# Patient Record
Sex: FEMALE | Race: AMERICAN INDIAN OR ALASKA NATIVE | NOT HISPANIC OR LATINO | Employment: UNEMPLOYED | URBAN - METROPOLITAN AREA
[De-identification: names, ages, dates, MRNs, and addresses within clinical notes are randomized per-mention and may not be internally consistent; named-entity substitution may affect disease eponyms.]

---

## 2017-03-29 PROBLEM — M19.179 POST-TRAUMATIC OSTEOARTHRITIS OF ANKLE: Status: ACTIVE | Noted: 2017-03-29

## 2017-05-02 PROBLEM — E03.9 HYPOTHYROIDISM: Status: ACTIVE | Noted: 2017-05-02

## 2017-05-02 PROBLEM — E03.4 HYPOTHYROIDISM DUE TO ACQUIRED ATROPHY OF THYROID: Status: ACTIVE | Noted: 2017-05-02

## 2017-12-13 ENCOUNTER — TELEPHONE (OUTPATIENT)
Dept: ADMINISTRATIVE | Facility: HOSPITAL | Age: 42
End: 2017-12-13

## 2018-01-24 ENCOUNTER — TELEPHONE (OUTPATIENT)
Dept: ADMINISTRATIVE | Facility: HOSPITAL | Age: 43
End: 2018-01-24

## 2018-06-20 ENCOUNTER — TELEPHONE (OUTPATIENT)
Dept: ADMINISTRATIVE | Facility: HOSPITAL | Age: 43
End: 2018-06-20

## 2018-08-03 ENCOUNTER — TELEPHONE (OUTPATIENT)
Dept: ADMINISTRATIVE | Facility: HOSPITAL | Age: 43
End: 2018-08-03

## 2018-09-07 ENCOUNTER — TELEPHONE (OUTPATIENT)
Dept: ADMINISTRATIVE | Facility: HOSPITAL | Age: 43
End: 2018-09-07

## 2018-10-04 ENCOUNTER — TELEPHONE (OUTPATIENT)
Dept: ADMINISTRATIVE | Facility: HOSPITAL | Age: 43
End: 2018-10-04

## 2020-07-10 PROBLEM — E03.9 HYPOTHYROIDISM: Status: RESOLVED | Noted: 2017-05-02 | Resolved: 2020-07-10

## 2020-07-10 PROBLEM — N92.1 MENORRHAGIA WITH IRREGULAR CYCLE: Status: ACTIVE | Noted: 2020-07-10

## 2020-08-17 PROBLEM — N92.0 MENORRHAGIA: Status: ACTIVE | Noted: 2020-08-17

## 2020-11-10 PROBLEM — N92.0 MENORRHAGIA: Status: RESOLVED | Noted: 2020-08-17 | Resolved: 2020-11-10

## 2020-11-10 PROBLEM — E78.2 MIXED HYPERLIPIDEMIA: Status: ACTIVE | Noted: 2020-11-10

## 2020-11-10 PROBLEM — N92.1 MENORRHAGIA WITH IRREGULAR CYCLE: Status: RESOLVED | Noted: 2020-07-10 | Resolved: 2020-11-10

## 2021-01-16 DIAGNOSIS — U07.1 COVID-19 VIRUS DETECTED: ICD-10-CM

## 2021-02-25 PROBLEM — Z86.16 HISTORY OF 2019 NOVEL CORONAVIRUS DISEASE (COVID-19): Status: ACTIVE | Noted: 2021-02-25

## 2021-05-06 ENCOUNTER — PATIENT MESSAGE (OUTPATIENT)
Dept: RESEARCH | Facility: HOSPITAL | Age: 46
End: 2021-05-06

## 2022-01-21 DIAGNOSIS — U07.1 COVID-19 VIRUS DETECTED: ICD-10-CM

## 2022-01-26 ENCOUNTER — LAB VISIT (OUTPATIENT)
Dept: PRIMARY CARE CLINIC | Facility: OTHER | Age: 47
End: 2022-01-26
Attending: INTERNAL MEDICINE
Payer: MEDICAID

## 2022-01-26 DIAGNOSIS — Z11.52 ENCOUNTER FOR SCREENING FOR COVID-19: Primary | ICD-10-CM

## 2022-01-26 LAB
CTP QC/QA: YES
SARS-COV-2 RDRP RESP QL NAA+PROBE: NEGATIVE

## 2022-01-26 PROCEDURE — U0002 COVID-19 LAB TEST NON-CDC: HCPCS

## 2022-06-07 ENCOUNTER — ANESTHESIA EVENT (OUTPATIENT)
Dept: SURGERY | Facility: HOSPITAL | Age: 47
End: 2022-06-07
Payer: MEDICAID

## 2022-06-07 NOTE — DISCHARGE INSTRUCTIONS
BEFORE THE PROCEDURE:    REPORT ANY CHANGE IN YOUR PHYSICAL CONDITION TO YOUR DOCTOR IMMEDIATELY.  SELF ISOLATE AND CHECK TEMPERATURE DAILY, IF TEMP OVER 100, CALL PHYSICIAN IMMEDIATELY.  TRY TO REFRAIN FROM SMOKING AND ALCOHOL 72 HOURS BEFORE YOUR PROCEDURE.   DO NOT EAT OR DRINK ANYTHING AFTER MIDNIGHT THE NIGHT BEFORE YOUR PROCEDURE.  NO MAKE UP, NAIL POLISH OR JEWELRY.      Surgery prep    SOMEONE WILL CALL YOU THE DAY BEFORE YOUR PROCEDURE WITH A CHECK-IN TIME FOR YOUR PROCEDURE.    DAY OF YOUR PROCEDURE:    TAKE BLOOD PRESSURE MEDICATIONS THE MORNING OF YOUR PROCEDURE, WITH SMALL SIPS WATER, AS DIRECTED BY YOUR PHYSICIAN.   DO NOT TAKE ANY DIABETIC MEDICATIONS UNLESS DIRECTED TO DO SO BY YOUR PHYSICIAN.   CONTACT LENSES AND DENTURES MUST BE REMOVED.  A RESPONSIBLE ADULT MUST ACCOMPANY YOU HOME UPON DISCHARGE.   ONLY 1 VISITOR ALLOWED PER ROOM.     YOUR THOUGHTS AND OPINIONS HELP US TO BETTER SERVE YOU.     PLEASE PARTICIPATE IN SURVEYS ABOUT YOUR CARE.    THANK YOU FOR CHOOSING [facility].

## 2022-06-08 ENCOUNTER — LAB VISIT (OUTPATIENT)
Dept: LAB | Facility: HOSPITAL | Age: 47
End: 2022-06-08
Attending: PODIATRIST
Payer: MEDICAID

## 2022-06-08 ENCOUNTER — HOSPITAL ENCOUNTER (OUTPATIENT)
Dept: PREADMISSION TESTING | Facility: HOSPITAL | Age: 47
Discharge: HOME | End: 2022-06-08
Attending: PODIATRIST
Payer: MEDICAID

## 2022-06-08 VITALS — BODY MASS INDEX: 44.16 KG/M2 | WEIGHT: 240 LBS | HEIGHT: 62 IN

## 2022-06-08 DIAGNOSIS — Z01.818 PREOP TESTING: Primary | ICD-10-CM

## 2022-06-08 DIAGNOSIS — Z01.818 PREOPERATIVE CLEARANCE: Primary | ICD-10-CM

## 2022-06-08 LAB
ANION GAP SERPL CALC-SCNC: 4 MMOL/L (ref 8–16)
APTT BLDCRRT: 27.7 SEC (ref 21–32)
B-HCG UR QL: NEGATIVE
BASOPHILS # BLD AUTO: 0.04 K/UL (ref 0–0.2)
BASOPHILS NFR BLD: 0.7 % (ref 0–1.9)
BUN SERPL-MCNC: 12 MG/DL (ref 6–20)
CALCIUM SERPL-MCNC: 8.6 MG/DL (ref 8.7–10.5)
CHLORIDE SERPL-SCNC: 105 MMOL/L (ref 95–110)
CO2 SERPL-SCNC: 30 MMOL/L (ref 23–29)
CREAT SERPL-MCNC: 1 MG/DL (ref 0.5–1.4)
DIFFERENTIAL METHOD: NORMAL
EOSINOPHIL # BLD AUTO: 0.1 K/UL (ref 0–0.5)
EOSINOPHIL NFR BLD: 1.3 % (ref 0–8)
ERYTHROCYTE [DISTWIDTH] IN BLOOD BY AUTOMATED COUNT: 12.9 % (ref 11.5–14.5)
EST. GFR  (AFRICAN AMERICAN): >60 ML/MIN/1.73 M^2
EST. GFR  (NON AFRICAN AMERICAN): >60 ML/MIN/1.73 M^2
GLUCOSE SERPL-MCNC: 149 MG/DL (ref 70–110)
HCT VFR BLD AUTO: 40.2 % (ref 37–48.5)
HGB BLD-MCNC: 13 G/DL (ref 12–16)
IMM GRANULOCYTES # BLD AUTO: 0.02 K/UL (ref 0–0.04)
IMM GRANULOCYTES NFR BLD AUTO: 0.4 % (ref 0–0.5)
INR PPP: 1 (ref 0.8–1.2)
LYMPHOCYTES # BLD AUTO: 1.8 K/UL (ref 1–4.8)
LYMPHOCYTES NFR BLD: 32.2 % (ref 18–48)
MCH RBC QN AUTO: 28.2 PG (ref 27–31)
MCHC RBC AUTO-ENTMCNC: 32.3 G/DL (ref 32–36)
MCV RBC AUTO: 87 FL (ref 82–98)
MONOCYTES # BLD AUTO: 0.4 K/UL (ref 0.3–1)
MONOCYTES NFR BLD: 6.3 % (ref 4–15)
NEUTROPHILS # BLD AUTO: 3.3 K/UL (ref 1.8–7.7)
NEUTROPHILS NFR BLD: 59.1 % (ref 38–73)
NRBC BLD-RTO: 0 /100 WBC
PLATELET # BLD AUTO: 203 K/UL (ref 150–450)
PMV BLD AUTO: 10.7 FL (ref 9.2–12.9)
POTASSIUM SERPL-SCNC: 3.7 MMOL/L (ref 3.5–5.1)
PROTHROMBIN TIME: 10.6 SEC (ref 9–12.5)
RBC # BLD AUTO: 4.61 M/UL (ref 4–5.4)
SODIUM SERPL-SCNC: 139 MMOL/L (ref 136–145)
WBC # BLD AUTO: 5.56 K/UL (ref 3.9–12.7)

## 2022-06-08 PROCEDURE — 80048 BASIC METABOLIC PNL TOTAL CA: CPT | Performed by: PODIATRIST

## 2022-06-08 PROCEDURE — 36415 COLL VENOUS BLD VENIPUNCTURE: CPT | Performed by: PODIATRIST

## 2022-06-08 PROCEDURE — 81025 URINE PREGNANCY TEST: CPT | Performed by: PODIATRIST

## 2022-06-08 PROCEDURE — 85025 COMPLETE CBC W/AUTO DIFF WBC: CPT | Performed by: PODIATRIST

## 2022-06-08 RX ORDER — FUROSEMIDE 20 MG/1
20 TABLET ORAL DAILY PRN
COMMUNITY
Start: 2022-04-06 | End: 2022-09-09 | Stop reason: SDUPTHER

## 2022-06-08 NOTE — ANESTHESIA PREPROCEDURE EVALUATION
06/08/2022  Valencia Shah is a 46 y.o., female.      Pre-op Assessment    I have reviewed the Patient Summary Reports.    I have reviewed the NPO Status.   I have reviewed the Medications.     Review of Systems  Anesthesia Hx:  No problems with previous Anesthesia  Denies Family Hx of Anesthesia complications.   Denies Personal Hx of Anesthesia complications.   Social:  Non-Smoker    Cardiovascular:  Cardiovascular Normal Hypertension, well controlled  ECG has been reviewed.    Pulmonary:  Pulmonary Normal    Renal/:  Renal/ Normal     Hepatic/GI:  Hepatic/GI Normal    Musculoskeletal:   Arthritis     Neurological:   Headaches    Endocrine:   Diabetes, well controlled, type 2      Lab Results   Component Value Date    WBC 6.22 02/02/2021    HGB 13.0 02/02/2021    HCT 41.3 02/02/2021    MCV 89 02/02/2021     02/02/2021     CMP  Sodium   Date Value Ref Range Status   03/23/2022 139 136 - 145 mmol/L Final     Potassium   Date Value Ref Range Status   03/23/2022 4.6 3.5 - 5.1 mmol/L Final     Chloride   Date Value Ref Range Status   03/23/2022 104 95 - 110 mmol/L Final     CO2   Date Value Ref Range Status   03/23/2022 28 23 - 29 mmol/L Final     Glucose   Date Value Ref Range Status   03/23/2022 99 70 - 110 mg/dL Final     BUN   Date Value Ref Range Status   03/23/2022 16 6 - 20 mg/dL Final     Creatinine   Date Value Ref Range Status   03/23/2022 0.9 0.5 - 1.4 mg/dL Final     Calcium   Date Value Ref Range Status   03/23/2022 9.4 8.7 - 10.5 mg/dL Final     Total Protein   Date Value Ref Range Status   02/02/2021 7.5 6.0 - 8.4 g/dL Final     Albumin   Date Value Ref Range Status   02/02/2021 3.8 3.5 - 5.2 g/dL Final     Total Bilirubin   Date Value Ref Range Status   02/02/2021 0.4 0.1 - 1.0 mg/dL Final     Comment:     For infants and newborns, interpretation of results should be based  on  gestational age, weight and in agreement with clinical  observations.    Premature Infant recommended reference ranges:  Up to 24 hours.............<8.0 mg/dL  Up to 48 hours............<12.0 mg/dL  3-5 days..................<15.0 mg/dL  6-29 days.................<15.0 mg/dL       Alkaline Phosphatase   Date Value Ref Range Status   02/02/2021 82 55 - 135 U/L Final     AST   Date Value Ref Range Status   02/02/2021 32 10 - 40 U/L Final     ALT   Date Value Ref Range Status   02/02/2021 68 (H) 10 - 44 U/L Final     Anion Gap   Date Value Ref Range Status   03/23/2022 7 (L) 8 - 16 mmol/L Final     eGFR if    Date Value Ref Range Status   03/23/2022 >60.0 >60 mL/min/1.73 m^2 Final     eGFR if non    Date Value Ref Range Status   03/23/2022 >60.0 >60 mL/min/1.73 m^2 Final     Comment:     Calculation used to obtain the estimated glomerular filtration  rate (eGFR) is the CKD-EPI equation.          Physical Exam  General: Well nourished    Airway:  Mallampati: II / II  Mouth Opening: Normal  TM Distance: Normal  Tongue: Normal  Neck ROM: Normal ROM    Dental:  Intact    Chest/Lungs:  Clear to auscultation    Heart:  Rate: Normal  Rhythm: Regular Rhythm  Sounds: Normal        Anesthesia Plan  Type of Anesthesia, risks & benefits discussed:    Anesthesia Type: Gen Supraglottic Airway, MAC  Intra-op Monitoring Plan: Standard ASA Monitors  Post Op Pain Control Plan: multimodal analgesia  Induction:  IV  Airway Plan: Direct  Informed Consent: Informed consent signed with the Patient and all parties understand the risks and agree with anesthesia plan.  All questions answered.   ASA Score: 3  Day of Surgery Review of History & Physical: I have interviewed and examined the patient. I have reviewed the patient's H&P dated: There are no significant changes.     Ready For Surgery From Anesthesia Perspective.     .

## 2022-06-10 ENCOUNTER — ANESTHESIA (OUTPATIENT)
Dept: SURGERY | Facility: HOSPITAL | Age: 47
End: 2022-06-10
Payer: MEDICAID

## 2022-06-17 ENCOUNTER — HOSPITAL ENCOUNTER (OUTPATIENT)
Facility: HOSPITAL | Age: 47
Discharge: HOME | End: 2022-06-17
Attending: PODIATRIST | Admitting: PODIATRIST
Payer: MEDICAID

## 2022-06-17 VITALS
TEMPERATURE: 98 F | OXYGEN SATURATION: 100 % | SYSTOLIC BLOOD PRESSURE: 97 MMHG | RESPIRATION RATE: 16 BRPM | DIASTOLIC BLOOD PRESSURE: 51 MMHG | HEART RATE: 64 BPM

## 2022-06-17 DIAGNOSIS — L85.8 CUTANEOUS HORN: ICD-10-CM

## 2022-06-17 LAB — POCT GLUCOSE: 109 MG/DL (ref 70–110)

## 2022-06-17 PROCEDURE — 37000009 HC ANESTHESIA EA ADD 15 MINS: Performed by: PODIATRIST

## 2022-06-17 PROCEDURE — 71000016 HC POSTOP RECOV ADDL HR: Performed by: PODIATRIST

## 2022-06-17 PROCEDURE — 63600175 PHARM REV CODE 636 W HCPCS: Performed by: NURSE ANESTHETIST, CERTIFIED REGISTERED

## 2022-06-17 PROCEDURE — 36000706: Performed by: PODIATRIST

## 2022-06-17 PROCEDURE — 71000015 HC POSTOP RECOV 1ST HR: Performed by: PODIATRIST

## 2022-06-17 PROCEDURE — 63600175 PHARM REV CODE 636 W HCPCS: Performed by: PODIATRIST

## 2022-06-17 PROCEDURE — 00400 ANES INTEGUMENTARY SYS NOS: CPT | Performed by: PODIATRIST

## 2022-06-17 PROCEDURE — 37000008 HC ANESTHESIA 1ST 15 MINUTES: Performed by: PODIATRIST

## 2022-06-17 PROCEDURE — 36000707: Performed by: PODIATRIST

## 2022-06-17 PROCEDURE — 25000003 PHARM REV CODE 250: Performed by: PODIATRIST

## 2022-06-17 RX ORDER — FENTANYL CITRATE 50 UG/ML
INJECTION, SOLUTION INTRAMUSCULAR; INTRAVENOUS
Status: DISCONTINUED | OUTPATIENT
Start: 2022-06-17 | End: 2022-06-17

## 2022-06-17 RX ORDER — BUPIVACAINE HYDROCHLORIDE 5 MG/ML
INJECTION, SOLUTION EPIDURAL; INTRACAUDAL
Status: DISCONTINUED | OUTPATIENT
Start: 2022-06-17 | End: 2022-06-17 | Stop reason: HOSPADM

## 2022-06-17 RX ORDER — LIDOCAINE HYDROCHLORIDE 10 MG/ML
INJECTION, SOLUTION INTRAVENOUS
Status: DISCONTINUED | OUTPATIENT
Start: 2022-06-17 | End: 2022-06-17

## 2022-06-17 RX ORDER — SODIUM CHLORIDE 0.9 % (FLUSH) 0.9 %
10 SYRINGE (ML) INJECTION
Status: DISCONTINUED | OUTPATIENT
Start: 2022-06-17 | End: 2022-06-17 | Stop reason: HOSPADM

## 2022-06-17 RX ORDER — ONDANSETRON 2 MG/ML
INJECTION INTRAMUSCULAR; INTRAVENOUS
Status: DISCONTINUED | OUTPATIENT
Start: 2022-06-17 | End: 2022-06-17

## 2022-06-17 RX ORDER — HYDROCODONE BITARTRATE AND ACETAMINOPHEN 5; 325 MG/1; MG/1
1 TABLET ORAL EVERY 4 HOURS PRN
Status: DISCONTINUED | OUTPATIENT
Start: 2022-06-17 | End: 2022-06-17 | Stop reason: HOSPADM

## 2022-06-17 RX ORDER — CEFAZOLIN SODIUM 2 G/50ML
2 SOLUTION INTRAVENOUS
Status: COMPLETED | OUTPATIENT
Start: 2022-06-17 | End: 2022-06-17

## 2022-06-17 RX ORDER — PHENYLEPHRINE HYDROCHLORIDE 10 MG/ML
INJECTION INTRAVENOUS
Status: DISCONTINUED | OUTPATIENT
Start: 2022-06-17 | End: 2022-06-17

## 2022-06-17 RX ORDER — PROPOFOL 10 MG/ML
INJECTION, EMULSION INTRAVENOUS
Status: DISCONTINUED | OUTPATIENT
Start: 2022-06-17 | End: 2022-06-17

## 2022-06-17 RX ORDER — MIDAZOLAM HYDROCHLORIDE 1 MG/ML
INJECTION INTRAMUSCULAR; INTRAVENOUS
Status: DISCONTINUED | OUTPATIENT
Start: 2022-06-17 | End: 2022-06-17

## 2022-06-17 RX ORDER — SODIUM CHLORIDE 9 MG/ML
INJECTION, SOLUTION INTRAVENOUS CONTINUOUS
Status: DISCONTINUED | OUTPATIENT
Start: 2022-06-17 | End: 2022-06-17 | Stop reason: HOSPADM

## 2022-06-17 RX ORDER — ONDANSETRON 4 MG/1
8 TABLET, ORALLY DISINTEGRATING ORAL EVERY 8 HOURS PRN
Status: DISCONTINUED | OUTPATIENT
Start: 2022-06-17 | End: 2022-06-17 | Stop reason: HOSPADM

## 2022-06-17 RX ADMIN — ONDANSETRON 4 MG: 2 INJECTION INTRAMUSCULAR; INTRAVENOUS at 07:06

## 2022-06-17 RX ADMIN — PROPOFOL 50 MG: 10 INJECTION, EMULSION INTRAVENOUS at 07:06

## 2022-06-17 RX ADMIN — MIDAZOLAM 2 MG: 1 INJECTION INTRAMUSCULAR; INTRAVENOUS at 07:06

## 2022-06-17 RX ADMIN — PROPOFOL 80 MG: 10 INJECTION, EMULSION INTRAVENOUS at 07:06

## 2022-06-17 RX ADMIN — PROPOFOL 30 MG: 10 INJECTION, EMULSION INTRAVENOUS at 07:06

## 2022-06-17 RX ADMIN — FENTANYL CITRATE 100 MCG: 50 INJECTION INTRAMUSCULAR; INTRAVENOUS at 07:06

## 2022-06-17 RX ADMIN — PHENYLEPHRINE HYDROCHLORIDE 100 MCG: 10 INJECTION INTRAVENOUS at 08:06

## 2022-06-17 RX ADMIN — LIDOCAINE HYDROCHLORIDE 50 MG: 10 INJECTION, SOLUTION INTRAVENOUS at 07:06

## 2022-06-17 RX ADMIN — PROPOFOL 30 MG: 10 INJECTION, EMULSION INTRAVENOUS at 08:06

## 2022-06-17 RX ADMIN — SODIUM CHLORIDE: 0.9 INJECTION, SOLUTION INTRAVENOUS at 06:06

## 2022-06-17 RX ADMIN — CEFAZOLIN SODIUM 2 G: 2 SOLUTION INTRAVENOUS at 06:06

## 2022-06-17 NOTE — PLAN OF CARE
Received pt to room 512 accompanied by NickyRN, pt aa&ox3, no c/o, rt foot ice and elevated. Dr jackson at bedside talking to pt and pts daughter in law. Ortho shoe on rt foot.

## 2022-06-17 NOTE — DISCHARGE INSTRUCTIONS
FOLLOW UP WITH DR LOUIE ON Monday June 20th @ 3:30PM.    TAKE PAIN MEDS AS PRESCRIBED.    NO DRIVING UNTIL DR LOUIE ALLOWS.    NO DRINKING ALCOHOL FOR 24 HOURS OR WHILE TAKING PAIN MEDS.    CALL DR LOUIE'S OFFICE FOR ANY QUESTIONS OR CONCERNS.  REPORT TO ER IF URGENT.    THANK YOU FOR CHOOSING [facility]!

## 2022-06-17 NOTE — BRIEF OP NOTE
[facility] - Surgery  Brief Operative Note    Surgery Date: 6/17/2022     Surgeon(s) and Role:     * Davin Jose DPM - Primary    Assisting Surgeon: None    Pre-op Diagnosis:  Cutaneous horn [L85.8]    Post-op Diagnosis:  Post-Op Diagnosis Codes:     * Cutaneous horn [L85.8]    Procedure(s) (LRB):  REMOVAL, FOREIGN BODY, FOOT (Right)    Anesthesia: MAC    Operative Findings: cutaneous horn, posterior lateral aspect of right heel. Measures 2cm in diameter.     Estimated Blood Loss: 5ml         Specimens:   Specimen (24h ago, onward)             Start     Ordered    06/17/22 0810  Specimen to Pathology, Surgery Other  Once        Comments: Pre-op Diagnosis: Cutaneous horn [L85.8]Procedure(s):REMOVAL, FOREIGN BODY, FOOT Number of specimens: 1Name of specimens: 1) subcutaneous horn right heel @ 0801     References:    Click here for ordering Quick Tip   Question Answer Comment   Procedure Type: Other    Specimen Class: Routine/Screening    Which provider would you like to cc? DAVIN JOSE    Release to patient Immediate        06/17/22 0810                  Discharge Note    OUTCOME: Patient tolerated treatment/procedure well without complication and is now ready for discharge.    DISPOSITION: Home or Self Care    FINAL DIAGNOSIS:  <principal problem not specified>    FOLLOWUP: In clinic    DISCHARGE INSTRUCTIONS:  No discharge procedures on file.

## 2022-06-17 NOTE — OP NOTE
OPERATIVE NOTE    DATE: 6/17/2022      PRE-OPERATIVE DIAGNOSIS: Cutaneous horn [L85.8]     POST-OPERATIVE DIAGNOSIS:  Cutaneous horn, right heel    PROCEDURE: excision of cutaneous horn, right heel    PATHOLOGY: Cutaneous horn, right heel.  Measures 2 cm in diameter    ANESTHESIA TYPE: Mac with local    HEMOSTASIS: well-padded pneumatic ankle tourniquet set at 250 mmHg for 7 minutes    BLOOD LOSS: 5 mL    MATERIALS: none    INJECTABLES: 0.25% Marcaine plain, 6 mL preop, 4 mL postop, regional block fashion of the right heel    COMPLICATIONS: none      FINDINGS: cutaneous horn to the posterior lateral aspect of right heel    ASSISTANT SURGEON: none      SURGERY IN DETAIL: patient brought into the operating room and placed on operating table in a supine position.  Anesthesia was administered to the patient via the anesthesia department.  The said local anesthetic was infiltrated into the right heel.  A well-padded pneumatic ankle tourniquet was placed to the right ankle.  Patient was prepped and draped in the typical aseptic manner.  A timeout was completed to identify the proper patient and operative site.  Esmarch bandage was used to exsanguinate the right lower extremity.  Tourniquet was lifted to 250 mmHg.  Attention was directed to the posterior lateral aspect of the heel, in the area of the cutaneous horn.  #15 blade was used to circumscribe the lesion.  Incision was carried down through the level of the subcutaneous tissues.  Central skin defect including all of the cutaneous horn was removed in total.  Area measured 2 cm in diameter.  Skin sent to pathology for analysis.  Surgical site irrigated with copious amounts of normal saline.  Tourniquet deflated at this time.  Revascularization of the tissues could be appreciated.  Surgical site was irrigated copious amounts of normal saline. Said postop local anesthetic infiltrated into the right heel in a regional block fashion. Dry dressing was applied to the right  heel.  Patient tolerated the procedure and anesthesia well.  Transferred from operating room to recovery room with vital signs stable and neurovascular status intact to right lower extremity.  Plan for patient to be discharged home, follow postop advanced directives, follow up in clinic early next week.

## 2022-06-17 NOTE — TRANSFER OF CARE
Anesthesia Transfer of Care Note    Patient: Valencia Shah    Procedure(s) Performed: Procedure(s) (LRB):  REMOVAL, FOREIGN BODY, FOOT (Right)    Patient location: Other: OR B    Anesthesia Type: MAC    Transport from OR: Transported from OR on room air with adequate spontaneous ventilation    Post pain: adequate analgesia    Post assessment: no apparent anesthetic complications    Post vital signs: stable    Level of consciousness: awake    Nausea/Vomiting: no nausea/vomiting    Complications: none    Transfer of care protocol was followed      Last vitals:   SPO2 92  RR 16  T 36.7  HR 64  BP 94/54

## 2022-06-17 NOTE — ANESTHESIA POSTPROCEDURE EVALUATION
Anesthesia Post Evaluation    Patient: Valencia Shah    Procedure(s) Performed: Procedure(s) (LRB):  REMOVAL, FOREIGN BODY, FOOT (Right)    Final Anesthesia Type: MAC      Patient location during evaluation: OPS  Patient participation: Yes- Able to Participate  Level of consciousness: awake  Post-procedure vital signs: reviewed and stable  Pain management: adequate  Airway patency: patent    PONV status at discharge: No PONV  Anesthetic complications: no      Cardiovascular status: blood pressure returned to baseline  Respiratory status: spontaneous ventilation  Hydration status: euvolemic  Follow-up not needed.          Vitals Value Taken Time   BP 97/51 06/17/22 0921   Temp 36.6 °C (97.9 °F) 06/17/22 0921   Pulse 64 06/17/22 0921   Resp 16 06/17/22 0921   SpO2 100 % 06/17/22 0921         No case tracking events are documented in the log.      Pain/Derick Score: Derick Score: 10 (6/17/2022  9:21 AM)

## 2022-06-24 LAB — SPECIMEN TO PATHOLOGY - SURGICAL: NORMAL

## 2022-09-09 PROBLEM — E11.42 DIABETIC PERIPHERAL NEUROPATHY ASSOCIATED WITH TYPE 2 DIABETES MELLITUS: Status: ACTIVE | Noted: 2022-09-09

## 2022-09-26 PROBLEM — R05.9 COUGH: Status: ACTIVE | Noted: 2022-09-26

## 2022-12-19 ENCOUNTER — PATIENT MESSAGE (OUTPATIENT)
Dept: ADMINISTRATIVE | Facility: OTHER | Age: 47
End: 2022-12-19
Payer: MEDICAID

## 2023-07-08 DIAGNOSIS — U07.1 COVID-19 VIRUS DETECTED: ICD-10-CM

## 2023-09-27 PROBLEM — K21.9 GASTROESOPHAGEAL REFLUX DISEASE WITHOUT ESOPHAGITIS: Status: ACTIVE | Noted: 2023-09-27

## 2024-02-03 ENCOUNTER — PATIENT MESSAGE (OUTPATIENT)
Dept: ADMINISTRATIVE | Facility: OTHER | Age: 49
End: 2024-02-03

## 2024-03-27 PROBLEM — G43.709 CHRONIC MIGRAINE WITHOUT AURA WITHOUT STATUS MIGRAINOSUS, NOT INTRACTABLE: Status: ACTIVE | Noted: 2024-03-27

## 2024-03-27 PROBLEM — E11.42 TYPE 2 DIABETES MELLITUS WITH DIABETIC POLYNEUROPATHY, WITHOUT LONG-TERM CURRENT USE OF INSULIN: Status: ACTIVE | Noted: 2024-03-27

## 2024-07-05 PROBLEM — R05.9 COUGH: Status: RESOLVED | Noted: 2022-09-26 | Resolved: 2024-07-05

## 2024-09-06 ENCOUNTER — TELEPHONE (OUTPATIENT)
Dept: PAIN MEDICINE | Facility: CLINIC | Age: 49
End: 2024-09-06

## 2024-10-04 PROBLEM — K59.03 DRUG-INDUCED CONSTIPATION: Status: ACTIVE | Noted: 2024-10-04

## 2024-10-04 PROBLEM — M47.816 SPONDYLOSIS OF LUMBAR REGION WITHOUT MYELOPATHY OR RADICULOPATHY: Status: ACTIVE | Noted: 2024-10-04

## 2024-10-04 PROBLEM — H40.003 GLAUCOMA SUSPECT OF BOTH EYES: Status: ACTIVE | Noted: 2024-10-04

## 2025-04-02 LAB
LEFT EYE DM RETINOPATHY: NEGATIVE
RIGHT EYE DM RETINOPATHY: NEGATIVE

## 2025-05-20 ENCOUNTER — TELEPHONE (OUTPATIENT)
Dept: OTOLARYNGOLOGY | Facility: CLINIC | Age: 50
End: 2025-05-20
Payer: MEDICAID

## 2025-05-20 ENCOUNTER — PATIENT OUTREACH (OUTPATIENT)
Dept: ADMINISTRATIVE | Facility: HOSPITAL | Age: 50
End: 2025-05-20
Payer: MEDICAID

## 2025-05-20 PROBLEM — H40.003 GLAUCOMA SUSPECT OF BOTH EYES: Status: RESOLVED | Noted: 2024-10-04 | Resolved: 2025-05-20

## 2025-05-20 NOTE — PROGRESS NOTES
External Record Received. Uploaded and hyper linked outside [facility] Exam into [employer] [facility]

## 2025-05-26 ENCOUNTER — CLINICAL SUPPORT (OUTPATIENT)
Dept: AUDIOLOGY | Facility: CLINIC | Age: 50
End: 2025-05-26
Payer: MEDICAID

## 2025-05-26 ENCOUNTER — OFFICE VISIT (OUTPATIENT)
Dept: OTOLARYNGOLOGY | Facility: CLINIC | Age: 50
End: 2025-05-26
Payer: MEDICAID

## 2025-05-26 DIAGNOSIS — R42 DIZZINESS AND GIDDINESS: ICD-10-CM

## 2025-05-26 DIAGNOSIS — H81.8X9 OTHER DISORDERS OF VESTIBULAR FUNCTION, UNSPECIFIED EAR: Primary | ICD-10-CM

## 2025-05-26 DIAGNOSIS — Z01.10 NORMAL HEARING TEST OF BOTH EARS: ICD-10-CM

## 2025-05-26 DIAGNOSIS — R42 VERTIGO: Primary | ICD-10-CM

## 2025-05-26 PROCEDURE — 4010F ACE/ARB THERAPY RXD/TAKEN: CPT | Mod: CPTII,,, | Performed by: NURSE PRACTITIONER

## 2025-05-26 PROCEDURE — 92567 TYMPANOMETRY: CPT | Mod: PBBFAC | Performed by: AUDIOLOGIST

## 2025-05-26 PROCEDURE — 1159F MED LIST DOCD IN RCRD: CPT | Mod: CPTII,,, | Performed by: NURSE PRACTITIONER

## 2025-05-26 PROCEDURE — 3044F HG A1C LEVEL LT 7.0%: CPT | Mod: CPTII,,, | Performed by: NURSE PRACTITIONER

## 2025-05-26 PROCEDURE — 99213 OFFICE O/P EST LOW 20 MIN: CPT | Mod: PBBFAC | Performed by: NURSE PRACTITIONER

## 2025-05-26 PROCEDURE — 92557 COMPREHENSIVE HEARING TEST: CPT | Mod: PBBFAC | Performed by: AUDIOLOGIST

## 2025-05-26 PROCEDURE — 99999 PR PBB SHADOW E&M-EST. PATIENT-LVL III: CPT | Mod: PBBFAC,,, | Performed by: NURSE PRACTITIONER

## 2025-05-26 PROCEDURE — 99204 OFFICE O/P NEW MOD 45 MIN: CPT | Mod: S$PBB,,, | Performed by: NURSE PRACTITIONER

## 2025-05-26 NOTE — PROGRESS NOTES
Subjective:   CHIEF COMPLAINT: Patient presents with dizziness, vertigo, and headaches that began suddenly 12 days ago.    HPI: Valencia Shah is a 50 yo female who  has a past medical history of Cutaneous horn, GI problem, High cholesterol, Hypertension, Menorrhagia with irregular cycle, Migraines, MRSA (methicillin resistant staph aureus) culture positive, Neuropathy, Post-traumatic osteoarthritis of ankle, Thyroid disease, and Type 2 diabetes mellitus without complication. She reports dizziness, lightheadedness that began suddenly on June 16th. She woke up that morning with severe symptoms, describing the dizziness described as room spinning sensation. She notes elevated blood pressure at the time of episode. The symptoms have been constant since onset, though they fluctuate in severity. Occur when standing or sitting, accompanied by a headache, and resolve when she lies down. She was evaluated at the emergency room on the afternoon of symptom onset, receiving fluids and meclizine, which provided temporary relief for about 1-2 hours before symptoms returned. She was prescribed 20 pills of meclizine, which she has since finished. Associated symptoms include nausea and blurred vision in both eyes, though the vision changes have somewhat improved since the ER visit. She reports feeling hot, lightheaded, and sweaty during episodes. 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 She has seen ophthalmology for the blurred vision with no abnormal findings. She notes that her PCP had recently adjusted her BP medications. She has a history of migraines. She takes butalbital-acetaminophen for her migraines, which manages them effectively. She denies any recent head trauma, ear surgeries, or changes in hearing. She reports no tinnitus or aural fullness. She denies ear pain, ear drainage, falls, or loss of  consciousness related to these episodes. She denies any chronic ear infections as an adult. No prior ear surgeries or trauma. No head trauma. No change in medications.        Past Medical History  She has a past medical history of Cutaneous horn, GI problem, High cholesterol, Hypertension, Menorrhagia with irregular cycle, Migraines, MRSA (methicillin resistant staph aureus) culture positive, Neuropathy, Post-traumatic osteoarthritis of ankle, Thyroid disease, and Type 2 diabetes mellitus without complication.    Past Surgical History  She has a past surgical history that includes  section; Foot surgery; Cholecystectomy; Tubal ligation; Hysteroscopy with dilation and curettage of uterus (N/A, 2020); Endometrial ablation (N/A, 2020); and Removal of foreign body from foot (Right, 2022).    Family History  Her family history includes Arthritis in her daughter and son; Cancer in her sister; Diabetes in her brother and mother; Diverticulitis in her father; Eczema in her daughter and son; Heart disease in her father; Hypertension in her father and mother; Kidney disease in her sister; Rheum arthritis in her sister; Thyroid disease in her brother, sister, and sister.    Social History  She reports that she has never smoked. She has never used smokeless tobacco. She reports current alcohol use. She reports that she does not use drugs.    Allergies  She has no known allergies.    Medications  She has a current medication list which includes the following prescription(s): amitriptyline, amlodipine, bd ultra-fine cristina pen needle, blood sugar diagnostic, blood-glucose meter, butalbital-acetaminophen, celecoxib, furosemide, ibuprofen, lancets, levothyroxine, liothyronine, lisinopril, meclizine, metformin, ondansetron, pantoprazole, pen needle, diabetic, polyethylene glycol, pravastatin, pregabalin, and [DISCONTINUED] medroxyprogesterone.    Review of Systems     Constitutional: Negative for chills and  fever.      HENT: Negative for ear discharge, ear infection, ear pain, hearing loss and ringing in the ears.      Neurological: Positive for dizziness and headaches.           Objective:     Constitutional:   She is oriented to person, place, and time. She appears well-developed and well-nourished. She appears alert.  Non-toxic appearance. She does not have a sickly appearance. She does not appear ill. No distress. Normal speech.      Head:  Normocephalic and atraumatic. Head is without abrasion, without contusion and without TMJ tenderness. Salivary glands normal.  Facial strength is normal.      Ears:    Right Ear: No drainage, swelling or tenderness. No mastoid tenderness. Tympanic membrane is not perforated, not erythematous, not retracted and not bulging. Tympanic membrane mobility is normal. No middle ear effusion.   Left Ear: No drainage, swelling or tenderness. No mastoid tenderness. Tympanic membrane is not perforated, not erythematous, not retracted and not bulging. Tympanic membrane mobility is normal.  No middle ear effusion.     Neck:      She has no cervical adenopathy.     Psychiatric:   She has a normal mood and affect. Her speech is normal and behavior is normal.     Neurological:   She is alert and oriented to person, place, and time. She has neurological normal, alert and oriented. No cranial nerve deficit. She displays a negative Romberg sign. Gait normal.     [place]-Hallpike Left: Negative for torsional and up-beating nystagmus.     [place] Right: Negative for torsional and up-beating nystagmus    Tandem Gait: normal    Romberg: Negative    Fukuda step test: negative    Audiogram:    I independently reviewed the tracings of the complete audiometric evaluation. I reviewed the audiogram with the patient as well. Pertinent findings include normal hearing, with a mild threshold noted at 3000 Hz, AD and normal hearing, with a mild threshold noted at 3000 Hz, AS      Procedure:  None    Imaging:  None    Assessment:     1. Vertigo    2. Normal hearing test of both ears    3. Dizziness and giddiness      Plan:   Vertigo  Do not suspect an inner ear disorder at this time, testing was WNL and headache symptom no consistent with otogenic cause. [place]-Hallpike was negative bilaterally and otoscopic exam was benign. Possible vestibular migraines? Or Hypertension?  No prior imaging, will obtain MRI of brain. Ordered referral to neurology for headache management and VNG for further evaluation of possible inner ear involvement. Patient encouraged to be careful when moving from sitting to standing and to stay well hydrated/ and monitor for symptoms of light-headedness.     Normal hearing test of both ears  Reviewed patient's audiometric testing interpretation which is consistent with essentially normal hearing with a mild threshold noted at 3000 Hz bilaterally. Hearing conservation strongly recommended when in noisy environments.     Will follow-up once VNG results are available.     This note was generated with the assistance of ambient listening technology. Verbal consent was obtained by the patient and accompanying visitor(s) for the recording of patient appointment to facilitate this note. I attest to having reviewed and edited the generated note for accuracy, though some syntax or spelling errors may persist. Please contact the author of this note for any clarification.

## 2025-05-26 NOTE — PROGRESS NOTES
Ms. Valencia Sahh was seen in the clinic today for an audiological evaluation.  Ms. Shah reported she has been experiencing dizziness with headache since May 16, 2023. Symptoms are better when she is lying down.    Audiological testing revealed essentially normal hearing, with a mild threshold noted at 3000 Hz, for the right ear and essentially normal hearing, with a mild threshold noted at 3000 Hz, for the left ear.  A speech reception threshold was obtained at 25 dBHL for the right ear and at 20 dBHL for the left ear.  Speech discrimination was 100% for the right ear and 100% for the left ear.      Tympanometry testing revealed a Type A tympanogram for the right ear and a Type A tympanogram for the left ear.      Recommendations:  1. Otologic evaluation  2. Annual audiological evaluation  3. Hearing protection when in noise

## 2025-06-09 NOTE — PROGRESS NOTES
HPI: 49 y.o. female with Cutaneous horn, GI problem, High cholesterol, Hypertension, Menorrhagia with irregular cycle, Migraines, MRSA (methicillin resistant staph aureus) culture positive, Neuropathy, Post-traumatic osteoarthritis of ankle, Thyroid disease, and Type 2 diabetes mellitus without complication. , who presents to clinic new headaches for evaluation of headaches, dizziness, and blurred vision following ER evaluation.     She reports dizziness, lightheadedness that began suddenly on May 16th. She woke up that morning with severe symptoms, describing the dizziness described as room spinning sensation. She notes elevated blood pressure at the time of episode. The symptoms have been constant since onset, though they fluctuate in severity. Occur when standing or sitting, accompanied by a headache, and resolve when she lies down. She notes that her PCP had recently adjusted her BP medications.       Reports headache frontally and behind her eyes that has been present for about 2 weeks. Reports haziness to her vision. Tylenol or motrin can sometimes give her temporary relief but headache returns shortly after. She does still have some dizziness. She has a history of migraines and reports these headaches are different.    Report 10# weight gain over the last 2 months. She was on ozempic however she was unable to get refills once her HbA1c was at 6.0    Headaches are described as a mild to moderate, pressure pain located behind her eyes and frontally.    Headaches can last all day.    Pain can be rated anywhere from 5 to 6, intensifying to peak rapidly.    The symptoms start  without aura.   Associated symptoms include hazy vision.    Currently experiencing some sort of headache on 15/30 days, with migraines occurring on 0/30 days.    Triggers for the symptoms include nothing.  Laying down makes the pain better. Nothing makes the pain worse.    Eye exam:  5/20/2025; ok per patient  Audiology: 5/26/2025  ENT:  5/26/2025    PMHx negative for TBI, Meningitis, Aneurysms, kidney stones, asthma, GI bleed, CAD/MI, CVA/TIA, cancer    Osteoporosis, diabetes    FAMILY HISTORY:  Migraines: yes  Aneurysms: grandmother  Brain tumors: Denies    HEADACHE BURDEN: has been unable to work since May 16.    TREATMENTS TRIED:  Ibuprofen  Amitriptyline  fioricet          Review of Systems   Constitutional:  Negative for chills and fever.   HENT:  Negative for hearing loss.    Eyes:  Positive for blurred vision.   Respiratory:  Negative for cough.    Cardiovascular:  Negative for chest pain.   Gastrointestinal:  Positive for heartburn and nausea.   Genitourinary:  Negative for dysuria.   Musculoskeletal:  Negative for myalgias.   Skin:  Negative for rash.   Neurological:  Positive for dizziness and headaches.   Endo/Heme/Allergies:  Does not bruise/bleed easily.   Psychiatric/Behavioral:  Negative for depression.          I have reviewed all of this patient's past medical and surgical histories as well as family and social histories and active allergies and medications as documented in the electronic medical record.      PHYSICAL EXAM:  Gen Appearance, well developed/nourished in no apparent distress  CV: 2+ distal pulses with no edema or swelling  Neuro:  MS: Awake, alert, oriented to place, person, time, situation. Sustains attention. Recent/remote memory intact, Language is full to spontaneous speech/repetition/naming/comprehension. Fund of Knowledge is full  CN:  PERRL, Extraoccular movements and visual fields are full. Normal facial sensation and strength, Hearing symmetric, Tongue and Palate are midline and strong. Shoulder Shrug symmetric and strong.  Motor: Normal bulk, tone, no abnormal movements. 5/5 strength bilateral upper/lower extremities with 2+ reflexes   Sensory: symmetric to light touch, pain, temp, and vibration/proprioception. Romberg negative  Cerebellar: Finger-nose, Heal-shin, Rapid alternating movements intact  Gait:  Normal stance, no ataxia    IMAGING:      LABS:  5/2025: CBC, CMP    ASSESSMENT/PLAN: Valencia Shah is a 49 y.o. female with history of migraines who presents for new onset of headaches and blurred vision. She does report a 10# weight gain in the last 2 months.     I recommend:   MRI head and MRV considering new onset headache with blurred vision  She has been evaluated by ENT, ophthalmology, and audiology in the last month with undetermined cause for her symptoms.   Start topamax for prevention  -Discussed potential for teratogenicity with treatment, patient understands if her family planning status should change she will contact office immediately and we will safely adjust medications as needed.   -Discussed common side effects and reassured them that these typically go away. Please notify the office if you are unable to tolerate.  -Discussed titration scheduled to get up to adequate dosing.         I have discussed realistic goals of care with patient at length as well as medication options, and need for lifestyle adjustment. I have explained that treatment will take time. We have agreed that the goal will be to reduce frequency/intensity/quantity of HA, not to be completely HA free. I have explained my non narcotic policy regarding headache treatment.  Patient to track frequency of headaches. Patient agreeable to work on lifestyle adjustments. Questions and concerns were sought and answered to the patient's stated verbal satisfaction.  The patient verbalizes understanding and agreement with the above stated treatment plan    Visit today included increased complexity associated with the care of the episodic problem headaches addressed and managing the longitudinal care of the patient due to the serious and/or complex managed problem(s).       Return to clinic in 3 months for follow-up visit.     CC: ROMERO Polo

## 2025-06-12 ENCOUNTER — OFFICE VISIT (OUTPATIENT)
Dept: NEUROLOGY | Facility: CLINIC | Age: 50
End: 2025-06-12
Payer: MEDICAID

## 2025-06-12 VITALS
DIASTOLIC BLOOD PRESSURE: 86 MMHG | HEIGHT: 63 IN | SYSTOLIC BLOOD PRESSURE: 140 MMHG | RESPIRATION RATE: 16 BRPM | WEIGHT: 250.75 LBS | HEART RATE: 62 BPM | BODY MASS INDEX: 44.43 KG/M2 | OXYGEN SATURATION: 98 %

## 2025-06-12 DIAGNOSIS — R51.9 ACUTE INTRACTABLE HEADACHE, UNSPECIFIED HEADACHE TYPE: Primary | ICD-10-CM

## 2025-06-12 DIAGNOSIS — R51.9 NEW ONSET HEADACHE: ICD-10-CM

## 2025-06-12 DIAGNOSIS — H53.8 BLURRED VISION: ICD-10-CM

## 2025-06-12 DIAGNOSIS — R42 VERTIGO: ICD-10-CM

## 2025-06-12 PROCEDURE — 3044F HG A1C LEVEL LT 7.0%: CPT | Mod: CPTII,,, | Performed by: FAMILY MEDICINE

## 2025-06-12 PROCEDURE — 99999 PR PBB SHADOW E&M-EST. PATIENT-LVL V: CPT | Mod: PBBFAC,,, | Performed by: FAMILY MEDICINE

## 2025-06-12 PROCEDURE — 3079F DIAST BP 80-89 MM HG: CPT | Mod: CPTII,,, | Performed by: FAMILY MEDICINE

## 2025-06-12 PROCEDURE — 3008F BODY MASS INDEX DOCD: CPT | Mod: CPTII,,, | Performed by: FAMILY MEDICINE

## 2025-06-12 PROCEDURE — 99215 OFFICE O/P EST HI 40 MIN: CPT | Mod: PBBFAC | Performed by: FAMILY MEDICINE

## 2025-06-12 PROCEDURE — 1160F RVW MEDS BY RX/DR IN RCRD: CPT | Mod: CPTII,,, | Performed by: FAMILY MEDICINE

## 2025-06-12 PROCEDURE — 4010F ACE/ARB THERAPY RXD/TAKEN: CPT | Mod: CPTII,,, | Performed by: FAMILY MEDICINE

## 2025-06-12 PROCEDURE — 99204 OFFICE O/P NEW MOD 45 MIN: CPT | Mod: S$PBB,,, | Performed by: FAMILY MEDICINE

## 2025-06-12 PROCEDURE — 1159F MED LIST DOCD IN RCRD: CPT | Mod: CPTII,,, | Performed by: FAMILY MEDICINE

## 2025-06-12 PROCEDURE — 3077F SYST BP >= 140 MM HG: CPT | Mod: CPTII,,, | Performed by: FAMILY MEDICINE

## 2025-06-12 PROCEDURE — G2211 COMPLEX E/M VISIT ADD ON: HCPCS | Mod: ,,, | Performed by: FAMILY MEDICINE

## 2025-06-12 RX ORDER — TOPIRAMATE 50 MG/1
TABLET, FILM COATED ORAL
Qty: 60 TABLET | Refills: 3 | Status: SHIPPED | OUTPATIENT
Start: 2025-06-12

## 2025-06-12 RX ORDER — EMPAGLIFLOZIN 10 MG/1
10 TABLET, FILM COATED ORAL EVERY MORNING
COMMUNITY
Start: 2025-05-26

## 2025-06-12 NOTE — PATIENT INSTRUCTIONS
Please titrate the Topamax from 25mg daily to 100mg daily using the following schedule:  -Week 1: take half a tablet at night.  -Week 2: take half a tablet in the morning and another half at night.  -Week 3: take half a tablet in the morning and a whole tablet at night.  -Week 4: take a whole tablet in the morning and a whole tablet at night.    Only titrate up if you are not experiencing any side effects we discussed and you are tolerating this medication. If you do experience side effects at a higher titration, step down to the titration dose before this.

## 2025-06-19 ENCOUNTER — HOSPITAL ENCOUNTER (OUTPATIENT)
Dept: RADIOLOGY | Facility: HOSPITAL | Age: 50
Discharge: HOME | End: 2025-06-19
Attending: FAMILY MEDICINE
Payer: MEDICAID

## 2025-06-19 ENCOUNTER — RESULTS FOLLOW-UP (OUTPATIENT)
Dept: NEUROLOGY | Facility: CLINIC | Age: 50
End: 2025-06-19

## 2025-06-19 DIAGNOSIS — H53.8 BLURRED VISION: ICD-10-CM

## 2025-06-19 DIAGNOSIS — R51.9 ACUTE INTRACTABLE HEADACHE, UNSPECIFIED HEADACHE TYPE: ICD-10-CM

## 2025-06-19 DIAGNOSIS — R51.9 NEW ONSET HEADACHE: ICD-10-CM

## 2025-06-19 PROCEDURE — 70544 MR ANGIOGRAPHY HEAD W/O DYE: CPT | Mod: TC

## 2025-06-19 PROCEDURE — 70551 MRI BRAIN STEM W/O DYE: CPT | Mod: TC

## 2025-06-20 ENCOUNTER — CLINICAL SUPPORT (OUTPATIENT)
Dept: AUDIOLOGY | Facility: CLINIC | Age: 50
End: 2025-06-20
Payer: MEDICAID

## 2025-06-20 ENCOUNTER — TELEPHONE (OUTPATIENT)
Dept: NEUROLOGY | Facility: CLINIC | Age: 50
End: 2025-06-20
Payer: MEDICAID

## 2025-06-20 DIAGNOSIS — R42 VERTIGO: ICD-10-CM

## 2025-06-20 DIAGNOSIS — H83.2X3 VESTIBULAR HYPOFUNCTION, BILATERAL: Primary | ICD-10-CM

## 2025-06-20 NOTE — TELEPHONE ENCOUNTER
----- Message from Tiffani sent at 2025  5:18 PM CDT -----  Contact: ayanna  Valencia Shah  MRN: [id number]  : 1975  PCP: Ghazal Monae  Home Phone      [phone]  Work Phone      Not on file.  Mobile          [phone]      MESSAGE: Patient called returning a missed call from Fer.      [phone]

## 2025-06-20 NOTE — PROGRESS NOTES
VNG EVALUATION    Referring physician:  BELÉN Jeronimo NP    Valencia Shah, a 49 y.o. female was seen today for a VNG evaluation. Ms. Shah reports that about a month ago, she woke up with vertigo, tinnitus,  imbalance, headache, and nausea and was seen in the emergency room. Her symptoms persisted for about 3 weeks. Ms. Shah has a history of migraine headaches and is followed by neurology. She denied taking any medications that may affect the results of today's evaluation.     Tympanometry revealed Type A tympanograms bilaterally.     Gaze nystagmus was absent.    Oculomotor function was normal.    Spontaneous nystagmus was absent    The head-hanging left Hallpike was negative for torsional and up-beating nystagmus.  The head-hanging right Hallpike was negative for torsional and up-beating nystagmus.    Static positional nystagmus was absent.    Unilateral weakness: 16% (left)  Directional preponderance 5% (left- beating)  RW: 5 d/s  LW: 4 d/s  RC: 6 d/s   d/s  BILATERAL CALORIC WEAKNESS    Fixation suppression was abnormal for the right cool irrigation.    Impression: Abnormal VNG; The bilateral caloric weakness is suggestive of a bilateral vestibular abnormality.        Recommend:   A formal Risk of Falls assessment and formal vestibular/balance rehab  Referral to Neurology  Follow-up with Parris Jeronimo NP to review the results of today's evaluation

## 2025-06-25 ENCOUNTER — TELEPHONE (OUTPATIENT)
Dept: OTOLARYNGOLOGY | Facility: CLINIC | Age: 50
End: 2025-06-25
Payer: MEDICAID

## 2025-07-24 ENCOUNTER — OFFICE VISIT (OUTPATIENT)
Dept: OTOLARYNGOLOGY | Facility: CLINIC | Age: 50
End: 2025-07-24
Payer: MEDICAID

## 2025-07-24 DIAGNOSIS — H90.3 SENSORINEURAL HEARING LOSS (SNHL) OF BOTH EARS: ICD-10-CM

## 2025-07-24 DIAGNOSIS — R42 DIZZINESS: Primary | ICD-10-CM

## 2025-07-24 DIAGNOSIS — G43.709 CHRONIC MIGRAINE WITHOUT AURA WITHOUT STATUS MIGRAINOSUS, NOT INTRACTABLE: ICD-10-CM

## 2025-07-24 NOTE — PROGRESS NOTES
The patient location is: [place]  The chief complaint leading to consultation is: VNG results    Visit type: audiovisual    Face to Face time with patient: 12  30 minutes of total time spent on the encounter, which includes face to face time and non-face to face time preparing to see the patient (eg, review of tests), Obtaining and/or reviewing separately obtained history, Documenting clinical information in the electronic or other health record, Independently interpreting results (not separately reported) and communicating results to the patient/family/caregiver, or Care coordination (not separately reported).       Each patient to whom he or she provides medical services by telemedicine is:  (1) informed of the relationship between the physician and patient and the respective role of any other health care provider with respect to management of the patient; and (2) notified that he or she may decline to receive medical services by telemedicine and may withdraw from such care at any time.    Subjective:   Valencia is a 50 y.o. female who presents via telehealth visit to discuss her VNG results.     Since our last visit 2 months ago, the dizziness is improved, but still happening. Notes blurred vision and headaches with the dizziness. Notes it is happening 1-2 times daily for a few minutes or sometimes up to a couple of hours. She notes vision changes also happen sporadically throughout the day with a slight headaches, but in these instances there will not be any dizziness. Saw neurology in June MRI and MRI ordered and started on Topamax. She feels this has helped a little.     Note from previous visit on 5/26/2025:  She reports dizziness, lightheadedness that began suddenly on June 16th. She woke up that morning with severe symptoms, describing the dizziness described as room spinning sensation. She notes elevated blood pressure at the time of episode. The symptoms have been constant since onset, though they  fluctuate in severity. Occur when standing or sitting, accompanied by a headache, and resolve when she lies down. She was evaluated at the emergency room on the afternoon of symptom onset, receiving fluids and meclizine, which provided temporary relief for about 1-2 hours before symptoms returned. She was prescribed 20 pills of meclizine, which she has since finished. Associated symptoms include nausea and blurred vision in both eyes, though the vision changes have somewhat improved since the ER visit. She reports feeling hot, lightheaded, and sweaty during episodes. 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 She has seen ophthalmology for the blurred vision with no abnormal findings. She notes that her PCP had recently adjusted her BP medications. She has a history of migraines. She takes butalbital-acetaminophen for her migraines, which manages them effectively. She denies any recent head trauma, ear surgeries, or changes in hearing. She reports no tinnitus or aural fullness. She denies ear pain, ear drainage, falls, or loss of consciousness related to these episodes. She denies any chronic ear infections as an adult. No prior ear surgeries or trauma. No head trauma. No change in medications.    The patient's medications, allergies, past medical, surgical, social and family histories were reviewed and updated as appropriate.    A detailed review of systems was obtained with pertinent positives as per the above HPI, and otherwise negative.   Objective:     Constitutional:   She appears well-developed and well-nourished. She appears alert. She is cooperative.  Non-toxic appearance. She does not have a sickly appearance. She does not appear ill. No distress. Normal speech.      Head:  Normocephalic and atraumatic.     Pulmonary/Chest:   Effort normal.     Psychiatric:   She has a normal mood and  affect. Her speech is normal and behavior is normal.     Neurological:   She is alert.       VNG     Tympanometry revealed Type A tympanograms bilaterally.      Gaze nystagmus was absent.     Oculomotor function was normal.     Spontaneous nystagmus was absent     The head-hanging left Hallpike was negative for torsional and up-beating nystagmus.  The head-hanging right Hallpike was negative for torsional and up-beating nystagmus.     Static positional nystagmus was absent.     Unilateral weakness: 16% (left)  Directional preponderance 5% (left- beating)  RW: 5 d/s  LW: 4 d/s  RC: 6 d/s   d/s  BILATERAL CALORIC WEAKNESS     Fixation suppression was abnormal for the right cool irrigation.     Impression: Abnormal VNG; The bilateral caloric weakness is suggestive of a bilateral vestibular abnormality.    Audiogram    Imaging  MRI BRAIN WITHOUT CONTRAST; MRV BRAIN WITHOUT CONTRAST  2025    FINDINGS:  Brain:     Ventricles are normal in size for age without evidence of hydrocephalus.     Mildly low lying cerebellar tonsils, but without overt Chiari malformation..  No evidence of recent or remote major vascular distribution infarct.  No evidence of recent or remote hemorrhage.  No intracranial mass effect or midline shift.     No extra-axial blood or fluid collections.     Major arterial vascular flow voids about the skullbase are preserved.     Bone marrow signal intensity is normal.     Patchy mucosal thickening and mucous retention cysts in the paranasal sinuses.     MRV:     Dural venous sinuses and major cerebral veins appear maintain normal flow related signal.  No evidence of dural venous sinus thrombosis.  No stenosis of the distal transverse sinuses.     Impression:  The brain appears within normal limits.  No evidence of acute intracranial pathology. Unremarkable MRV, without evidence of dural venous sinus thrombosis.      Assessment:     1. Dizziness    2. Sensorineural hearing loss (SNHL) of both  ears    3. Chronic migraine without aura without status migrainosus, not intractable      Plan:     Dizziness  VNG results reviewed and vestibular rehab recommended. Patient was agreeable with this plan.     Sensorineural hearing loss (SNHL) of both ears  Audiometric testing interpretation consistent with MILD sensorineural hearing loss. Discussed the etiology of SNHL. While hearing loss is in the mild range, she is medically cleared for hearing amplification, and can follow-up with outside hearing aid vendor if interested. Hearing conservation in noisy environments.     Chronic migraine without aura without status migrainosus, not intractable  Following with neuro

## (undated) DEVICE — PENCIL ELECTROCAUTERY W/ HLSTR

## (undated) DEVICE — BANDAGE GAUZE COT STRL 4.5X4.1

## (undated) DEVICE — GLOVE SURGICAL LATEX SZ 7

## (undated) DEVICE — TRAY MAJOR ORTHO SPILT SURG

## (undated) DEVICE — SYR 10CC LUER LOCK

## (undated) DEVICE — NDL 27G X 1 1/4

## (undated) DEVICE — DRAPE EXTREMITY 90X124IN STRL

## (undated) DEVICE — CLEANER TIP ELECSURG 2X2IN

## (undated) DEVICE — UNDERGLOVES BIOGEL PI SIZE 7.5

## (undated) DEVICE — COVER WARMING BODY UPPER 78X21

## (undated) DEVICE — UNDERGLOVES BIOGEL PI SZ 7 LF

## (undated) DEVICE — COVER OVERHEAD SURG LT BLUE

## (undated) DEVICE — STRAP KNEE & BODY DISP 4X34IN

## (undated) DEVICE — GLOVE BIOGEL ECLIPSE SZ 7.5

## (undated) DEVICE — SOL NACL IRR 1000ML BTL

## (undated) DEVICE — TOURNIQUET SB QC DP 18X4IN

## (undated) DEVICE — LABEL BARKLEY 9/16X1-7/8IN

## (undated) DEVICE — ELECTRODE REM PLYHSV RETURN 9